# Patient Record
Sex: MALE | Race: WHITE | NOT HISPANIC OR LATINO | Employment: FULL TIME | URBAN - METROPOLITAN AREA
[De-identification: names, ages, dates, MRNs, and addresses within clinical notes are randomized per-mention and may not be internally consistent; named-entity substitution may affect disease eponyms.]

---

## 2023-05-25 LAB
ANION GAP IN SER/PLAS: 14 MMOL/L (ref 10–20)
CALCIUM (MG/DL) IN SER/PLAS: 9.7 MG/DL (ref 8.6–10.3)
CARBON DIOXIDE, TOTAL (MMOL/L) IN SER/PLAS: 29 MMOL/L (ref 21–32)
CHLORIDE (MMOL/L) IN SER/PLAS: 102 MMOL/L (ref 98–107)
CREATININE (MG/DL) IN SER/PLAS: 1.04 MG/DL (ref 0.5–1.3)
ERYTHROCYTE DISTRIBUTION WIDTH (RATIO) BY AUTOMATED COUNT: 12.8 % (ref 11.5–14.5)
ERYTHROCYTE MEAN CORPUSCULAR HEMOGLOBIN CONCENTRATION (G/DL) BY AUTOMATED: 33.1 G/DL (ref 32–36)
ERYTHROCYTE MEAN CORPUSCULAR VOLUME (FL) BY AUTOMATED COUNT: 90 FL (ref 80–100)
ERYTHROCYTES (10*6/UL) IN BLOOD BY AUTOMATED COUNT: 5.32 X10E12/L (ref 4.5–5.9)
GFR MALE: >90 ML/MIN/1.73M2
GLUCOSE (MG/DL) IN SER/PLAS: 86 MG/DL (ref 74–99)
HEMATOCRIT (%) IN BLOOD BY AUTOMATED COUNT: 47.8 % (ref 41–52)
HEMOGLOBIN (G/DL) IN BLOOD: 15.8 G/DL (ref 13.5–17.5)
LEUKOCYTES (10*3/UL) IN BLOOD BY AUTOMATED COUNT: 6.3 X10E9/L (ref 4.4–11.3)
PLATELETS (10*3/UL) IN BLOOD AUTOMATED COUNT: 172 X10E9/L (ref 150–450)
POTASSIUM (MMOL/L) IN SER/PLAS: 4.7 MMOL/L (ref 3.5–5.3)
SODIUM (MMOL/L) IN SER/PLAS: 140 MMOL/L (ref 136–145)
UREA NITROGEN (MG/DL) IN SER/PLAS: 12 MG/DL (ref 6–23)

## 2023-06-01 ENCOUNTER — HOSPITAL ENCOUNTER (OUTPATIENT)
Dept: DATA CONVERSION | Facility: HOSPITAL | Age: 28
End: 2023-06-01
Attending: INTERNAL MEDICINE | Admitting: INTERNAL MEDICINE
Payer: COMMERCIAL

## 2023-06-01 DIAGNOSIS — I48.0 PAROXYSMAL ATRIAL FIBRILLATION (MULTI): ICD-10-CM

## 2023-06-01 DIAGNOSIS — I47.10 SUPRAVENTRICULAR TACHYCARDIA (CMS-HCC): ICD-10-CM

## 2023-06-01 DIAGNOSIS — I48.91 UNSPECIFIED ATRIAL FIBRILLATION (MULTI): ICD-10-CM

## 2023-06-06 LAB
POC ACTIVATED CLOTTING TIME HIGH RANGE: 202 SECONDS (ref 96–152)
POC ACTIVATED CLOTTING TIME HIGH RANGE: 286 SECONDS (ref 96–152)
POC ACTIVATED CLOTTING TIME HIGH RANGE: 323 SECONDS (ref 96–152)

## 2023-08-18 LAB
ANION GAP IN SER/PLAS: 8 MMOL/L (ref 10–20)
CALCIUM (MG/DL) IN SER/PLAS: 9.6 MG/DL (ref 8.6–10.3)
CARBON DIOXIDE, TOTAL (MMOL/L) IN SER/PLAS: 30 MMOL/L (ref 21–32)
CHLORIDE (MMOL/L) IN SER/PLAS: 103 MMOL/L (ref 98–107)
CREATININE (MG/DL) IN SER/PLAS: 1.06 MG/DL (ref 0.5–1.3)
ERYTHROCYTE DISTRIBUTION WIDTH (RATIO) BY AUTOMATED COUNT: 12.5 % (ref 11.5–14.5)
ERYTHROCYTE MEAN CORPUSCULAR HEMOGLOBIN CONCENTRATION (G/DL) BY AUTOMATED: 34 G/DL (ref 32–36)
ERYTHROCYTE MEAN CORPUSCULAR VOLUME (FL) BY AUTOMATED COUNT: 88 FL (ref 80–100)
ERYTHROCYTES (10*6/UL) IN BLOOD BY AUTOMATED COUNT: 5.09 X10E12/L (ref 4.5–5.9)
GFR MALE: >90 ML/MIN/1.73M2
GLUCOSE (MG/DL) IN SER/PLAS: 89 MG/DL (ref 74–99)
HEMATOCRIT (%) IN BLOOD BY AUTOMATED COUNT: 45 % (ref 41–52)
HEMOGLOBIN (G/DL) IN BLOOD: 15.3 G/DL (ref 13.5–17.5)
LEUKOCYTES (10*3/UL) IN BLOOD BY AUTOMATED COUNT: 5.6 X10E9/L (ref 4.4–11.3)
PLATELETS (10*3/UL) IN BLOOD AUTOMATED COUNT: 172 X10E9/L (ref 150–450)
POTASSIUM (MMOL/L) IN SER/PLAS: 4.3 MMOL/L (ref 3.5–5.3)
SODIUM (MMOL/L) IN SER/PLAS: 137 MMOL/L (ref 136–145)
UREA NITROGEN (MG/DL) IN SER/PLAS: 12 MG/DL (ref 6–23)

## 2023-08-23 ENCOUNTER — HOSPITAL ENCOUNTER (OUTPATIENT)
Dept: DATA CONVERSION | Facility: HOSPITAL | Age: 28
End: 2023-08-23
Attending: INTERNAL MEDICINE | Admitting: INTERNAL MEDICINE
Payer: COMMERCIAL

## 2023-08-23 DIAGNOSIS — I45.6 PRE-EXCITATION SYNDROME: ICD-10-CM

## 2023-08-23 DIAGNOSIS — I47.10 SUPRAVENTRICULAR TACHYCARDIA (CMS-HCC): ICD-10-CM

## 2023-09-30 NOTE — H&P
History of Present Illness:   History Present Illness:  Reason for surgery: svt/ afiib ablation   HPI:    27 with no prior medical hx, presented for SVT/ Afib ablation. He was diagnosed with Afib on 2020, since then he had 3 episodes, only  AATD is flecainide, he is alos  on metop , not on AC.       The indications, risks, benefits, alternatives, and details of the procedure were explained to the patient who expressed understanding of the risks including but are not limited to: pain, bleeding, and infection for which the risk is less than 1%. More  serious risks, including cardiac perforation and tamponade, vascular trauma, pulmonary vein stenosis, atrio-esophageal fistula, diaphragmatic paralysis, life-threatening arrhythmia, need for permanent pacemaker, stroke, heart attack, and/or death, for  which the overall risk ranges 0.1-1%. After all questions were answered, the patient provided informed and written consent.       Allergies:        Allergies:  ·  amoxicillin : Angioedema (Moderate)    Home Medication Review:   Home Medications Reviewed: yes     Impression/Procedure:   ·  Impression and Planned Procedure: afib ablation       ERAS (Enhanced Recovery After Surgery):  ·  ERAS Patient: no       Physical Exam by System:    Respiratory/Thorax: Patent airways, CTAB, normal  breath sounds with good chest expansion, thorax symmetric   Cardiovascular: Regular, rate and rhythm, no murmurs,  2+ equal pulses of the extremities, normal S 1and S 2     Airway/Sedation Assessment:  ·  Emotional Status calm   ·  Neurologic alert & oriented x 3   ·  Respiratory clear to auscultation   ·  Cardiovascular rhythm & rate regular   ·  GI/ soft, nontender     · Pulses present: Pedal Left, Pedal Right, Radial Left, Radial Right     ·  Mouth Opening OK yes   ·  Neck Flexibility OK yes   ·  Loose Teeth no   ·  Oropharyngeal Classification Class II   ·  ASA PS Classification ASA II   ·  Sedation Plan moderate sedation     Consent:    COVID-19 Consent:  ·  COVID-19 Risk Consent Surgeon has reviewed key risks related to the risk of neli COVID-19 and if they contract COVID-19 what the risks are.     Coronavirus Attestation of Need for Surgery:  ·  COVID-19 Surgery / Procedure Attestation: Select all criteria that apply Risk of metastasis or progression of staging if delayed     Attestation:   Note Completion:  I am a:  Resident/Fellow   Attending Attestation I saw and evaluated the patient.  I personally obtained the key and critical portions of the history and physical exam or was physically present for key and  critical portions performed by the resident/fellow. I reviewed the resident/fellow?s documentation and discussed the patient with the resident/fellow.  I agree with the resident/fellow?s medical decision making as documented in the note.     I personally evaluated the patient on 01-Jun-2023         Electronic Signatures:  Eros Burnham)  (Signed 14-Jun-2023 09:16)   Authored: Note Completion   Co-Signer: History of Present Illness, Allergies, Home Medication Review, Impression/Procedure, ERAS, Physical Exam, Consent, Note Completion  Violeta Terry (Resident))  (Signed 01-Jun-2023 12:21)   Authored: History of Present Illness, Allergies, Home  Medication Review, Impression/Procedure, ERAS, Physical Exam, Consent, Note Completion      Last Updated: 14-Jun-2023 09:16 by Eros Burnham)

## 2023-09-30 NOTE — H&P
History of Present Illness:   History Present Illness:  Reason for surgery: WPW   HPI:    This is a 27 year old with a PMH significant for AF s/p RFA with PVI and WPW pattern on ECG with palpitations and SVT presenting for RFA.     Allergies:        Allergies:  ·  amoxicillin : Angioedema (Moderate)    Home Medication Review:   Home Medications Reviewed: yes     Impression/Procedure:   ·  Impression and Planned Procedure: WPW pattern with palpitations presenting for RFA       ERAS (Enhanced Recovery After Surgery):  ·  ERAS Patient: no       Physical Exam by System:    Constitutional: Well developed, awake/alert/oriented  x3, no distress, alert and cooperative   Respiratory/Thorax: Patent airways, normal breath  sounds with good chest expansion, thorax symmetric   Cardiovascular: Regular,  2+ equal pulses of the  extremities,   Gastrointestinal: Nondistended, soft, non-tender   Neurological: alert and oriented x3   Skin: Warm and dry, no lesions, no rashes     Consent:   COVID-19 Consent:  ·  COVID-19 Risk Consent Surgeon has reviewed key risks related to the risk of neli COVID-19 and if they contract COVID-19 what the risks are.     Attestation:   Note Completion:  I am a:  Resident/Fellow   Attending Attestation I saw and evaluated the patient.  I personally obtained the key and critical portions of the history and physical exam or was physically present for key and  critical portions performed by the resident/fellow. I reviewed the resident/fellow?s documentation and discussed the patient with the resident/fellow.  I agree with the resident/fellow?s medical decision making as documented in the note.     I personally evaluated the patient on 23-Aug-2023         Electronic Signatures:  Shane Juarez (Fellow))  (Signed 23-Aug-2023 17:01)   Authored: History of Present Illness, Allergies, Home  Medication Review, Impression/Procedure, ERAS, Physical Exam, Consent, Note Completion  Eros Burnham)   (Signed 24-Aug-2023 10:20)   Authored: Note Completion   Co-Signer: History of Present Illness, Allergies, Home Medication Review, Impression/Procedure, ERAS, Physical Exam, Consent, Note Completion      Last Updated: 24-Aug-2023 10:20 by Eros Burnham)

## 2023-10-07 PROBLEM — I45.6 WOLFF-PARKINSON-WHITE (WPW) PATTERN: Status: ACTIVE | Noted: 2023-10-07

## 2023-10-07 PROBLEM — I48.0 PAROXYSMAL ATRIAL FIBRILLATION (MULTI): Status: ACTIVE | Noted: 2023-10-07

## 2023-10-07 RX ORDER — METOPROLOL SUCCINATE 100 MG/1
100 TABLET, EXTENDED RELEASE ORAL DAILY
COMMUNITY
End: 2024-01-24 | Stop reason: SDUPTHER

## 2023-10-07 RX ORDER — FLECAINIDE ACETATE 50 MG/1
50 TABLET ORAL 2 TIMES DAILY
COMMUNITY
End: 2023-10-10 | Stop reason: ALTCHOICE

## 2023-10-07 RX ORDER — RIVAROXABAN 20 MG/1
20 TABLET, FILM COATED ORAL DAILY
COMMUNITY
Start: 2023-06-01 | End: 2023-10-09 | Stop reason: ALTCHOICE

## 2023-10-07 RX ORDER — MULTIVITAMIN
1 TABLET ORAL DAILY
COMMUNITY

## 2023-10-07 RX ORDER — PANTOPRAZOLE SODIUM 40 MG/1
40 TABLET, DELAYED RELEASE ORAL 2 TIMES DAILY
COMMUNITY
Start: 2023-06-01 | End: 2023-10-10 | Stop reason: ALTCHOICE

## 2023-10-09 ENCOUNTER — OFFICE VISIT (OUTPATIENT)
Dept: CARDIOLOGY | Facility: HOSPITAL | Age: 28
End: 2023-10-09
Payer: COMMERCIAL

## 2023-10-09 ENCOUNTER — HOSPITAL ENCOUNTER (OUTPATIENT)
Dept: CARDIOLOGY | Facility: HOSPITAL | Age: 28
Discharge: HOME | End: 2023-10-09
Payer: COMMERCIAL

## 2023-10-09 VITALS
WEIGHT: 214 LBS | BODY MASS INDEX: 30.71 KG/M2 | OXYGEN SATURATION: 99 % | SYSTOLIC BLOOD PRESSURE: 137 MMHG | DIASTOLIC BLOOD PRESSURE: 81 MMHG | HEART RATE: 80 BPM

## 2023-10-09 DIAGNOSIS — I45.6 WOLFF-PARKINSON-WHITE (WPW) PATTERN: Primary | ICD-10-CM

## 2023-10-09 DIAGNOSIS — I48.0 PAROXYSMAL ATRIAL FIBRILLATION (MULTI): ICD-10-CM

## 2023-10-09 PROCEDURE — 93005 ELECTROCARDIOGRAM TRACING: CPT

## 2023-10-09 PROCEDURE — 99214 OFFICE O/P EST MOD 30 MIN: CPT | Mod: 25 | Performed by: NURSE PRACTITIONER

## 2023-10-09 PROCEDURE — 93010 ELECTROCARDIOGRAM REPORT: CPT | Performed by: INTERNAL MEDICINE

## 2023-10-09 PROCEDURE — 1036F TOBACCO NON-USER: CPT | Performed by: NURSE PRACTITIONER

## 2023-10-09 PROCEDURE — 99214 OFFICE O/P EST MOD 30 MIN: CPT | Performed by: NURSE PRACTITIONER

## 2023-10-09 RX ORDER — MULTIVITAMIN
1 TABLET ORAL DAILY
Status: CANCELLED | OUTPATIENT
Start: 2023-10-09

## 2023-10-10 ASSESSMENT — ENCOUNTER SYMPTOMS
FEVER: 0
DOUBLE VISION: 0
ORTHOPNEA: 0
SNORING: 0
SORE THROAT: 0
DYSPNEA ON EXERTION: 0
HEMOPTYSIS: 0
NAUSEA: 0
BLURRED VISION: 0
PND: 0
PALPITATIONS: 0
SHORTNESS OF BREATH: 0
LIGHT-HEADEDNESS: 0
NEAR-SYNCOPE: 0
DIAPHORESIS: 0
SYNCOPE: 0
ABDOMINAL PAIN: 0
WEAKNESS: 0
DIZZINESS: 0
HEADACHES: 0
VOMITING: 0
MYALGIAS: 0
IRREGULAR HEARTBEAT: 0
FALLS: 0
COUGH: 0
DIARRHEA: 0
SPUTUM PRODUCTION: 0

## 2023-10-10 NOTE — PROGRESS NOTES
Subjective   Cristian Sanchez is a 28 y.o. male.    Initially referred by Dr. RAZA Colon for evaluation of AF. He presents today for 1 month follow up post WPW RFA    PMH includes AF (diagnosed 8/2020).   Treatment of his AF includes Flecainide and Metoprolol.   Symptoms of his AF includes palpitations which he describes as strong, rapid heat beats in the center of his chest.    Pt was initially diagnosed with AF in 8/2020. Then started on Flecainide in 11/2020 after being admitted for AF s/p COVID. He had a 3rd episode of AF in 6/2022 after missing doses of Flecainide.     Echo 8/2020: LV systolic function is normal; LVEF 56%. No LVH, no significant structural valvular abnormalities and normal left atrium.    Now s/p Afib RFA and negative EP study with Dr. Burnham 6/1/2023  Patient unfortunately had an ED visit 6/20 for palpitations and chest discomfort. ECG showed NSR with PACs  ECG 6/26/2023 NSR with WPW pattern    Now s/p midseptal accessory pathway RFA with Dr. Burnham 8/23/2023  ECG 10/9/2023 NSR HR 80 bpm, no preexcitation noted.    TODAY Patient is doing well 1 month post ablation.  He completed his Xarelto, Flecainide, and Pantoprazole therapy and is currently only taking metoprolol once a day.  He denies any chest pain or SOB.  He denies any episodes of rapid heart rates. He has felt a single palpitation here and there. He is wondering if his medications could be causing some side effects and would like to try to get off the metoprolol        Review of Systems   Constitutional: Negative for diaphoresis, fever and malaise/fatigue.   HENT:  Negative for congestion and sore throat.    Eyes:  Negative for blurred vision and double vision.   Cardiovascular:  Negative for chest pain, dyspnea on exertion, irregular heartbeat, leg swelling, near-syncope, orthopnea, palpitations, paroxysmal nocturnal dyspnea and syncope.   Respiratory:  Negative for cough, hemoptysis, shortness of breath, snoring and sputum  production.    Hematologic/Lymphatic: Negative for bleeding problem.   Skin:  Negative for rash.   Musculoskeletal:  Negative for falls, joint pain and myalgias.   Gastrointestinal:  Negative for abdominal pain, diarrhea, nausea and vomiting.   Neurological:  Negative for dizziness, headaches, light-headedness and weakness.   All other systems reviewed and are negative.      Objective   Constitutional:       Appearance: Healthy appearance. Not in distress.   Eyes:      Conjunctiva/sclera: Conjunctivae normal.   HENT:      Nose: Nose normal.    Mouth/Throat:      Pharynx: Oropharynx is clear.   Neck:      Vascular: No JVR. JVD normal.   Pulmonary:      Effort: Pulmonary effort is normal.      Breath sounds: Normal breath sounds. No wheezing. No rhonchi.   Chest:      Chest wall: Not tender to palpatation.   Cardiovascular:      Normal rate. Regular rhythm.      Murmurs: There is no murmur.      No rub.   Pulses:     Intact distal pulses.   Edema:     Peripheral edema absent.   Abdominal:      General: Bowel sounds are normal.      Palpations: Abdomen is soft.   Musculoskeletal: Normal range of motion.      Cervical back: Neck supple. Skin:     General: Skin is warm and dry.      Comments: Right groin site well approximated, no bruising/bleeding/swelling/redness/pain   Neurological:      Mental Status: Alert and oriented to person, place and time.      Motor: Motor function is intact.         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 A diagnosis of Paroxysmal atrial fibrillation  (CMS/[facility]) was also pertinent to this visit.  He is doing well since his WPW ablation.  He denies any further episodes palpitations or heart racing.  He is wondering if the medications could be causing patchy hair loss in his beard.  This is not a typical side effect from Xarelto, Flecainide or metoprolol, although metoprolol can cause itching.  He denies any pruritus.  He is now only on metoprolol, which I recommend he stay on until he's about 3 months post ablation. At that point, he can cut his dose in half and then potentially wean down further as directed by his general cardiologist    Continue metoprolol until 3 months post ablation, then cut the dose in half (50 mg daily). We discussed possible withdrawal symptoms should he abruptly stop his beta blocker and patient was encouraged not to do that.  Follow up with general cardiology as scheduled, follow up with EP as needed

## 2023-10-25 LAB
ATRIAL RATE: 80 BPM
P AXIS: 53 DEGREES
P OFFSET: 202 MS
P ONSET: 154 MS
PR INTERVAL: 130 MS
Q ONSET: 219 MS
QRS COUNT: 13 BEATS
QRS DURATION: 86 MS
QT INTERVAL: 374 MS
QTC CALCULATION(BAZETT): 431 MS
QTC FREDERICIA: 412 MS
R AXIS: 63 DEGREES
T AXIS: 18 DEGREES
T OFFSET: 406 MS
VENTRICULAR RATE: 80 BPM

## 2024-01-17 ENCOUNTER — APPOINTMENT (OUTPATIENT)
Dept: CARDIOLOGY | Facility: CLINIC | Age: 29
End: 2024-01-17
Payer: COMMERCIAL

## 2024-01-20 ENCOUNTER — LAB REQUISITION (OUTPATIENT)
Dept: LAB | Facility: HOSPITAL | Age: 29
End: 2024-01-20
Payer: COMMERCIAL

## 2024-01-20 DIAGNOSIS — R35.0 FREQUENCY OF MICTURITION: ICD-10-CM

## 2024-01-20 PROCEDURE — 87491 CHLMYD TRACH DNA AMP PROBE: CPT

## 2024-01-20 PROCEDURE — 87661 TRICHOMONAS VAGINALIS AMPLIF: CPT

## 2024-01-20 PROCEDURE — 87086 URINE CULTURE/COLONY COUNT: CPT

## 2024-01-20 PROCEDURE — 87800 DETECT AGNT MULT DNA DIREC: CPT

## 2024-01-20 PROCEDURE — 87591 N.GONORRHOEAE DNA AMP PROB: CPT

## 2024-01-21 LAB
C TRACH RRNA SPEC QL NAA+PROBE: NEGATIVE
N GONORRHOEA DNA SPEC QL PROBE+SIG AMP: NEGATIVE
T VAGINALIS RRNA SPEC QL NAA+PROBE: NEGATIVE

## 2024-01-22 LAB — BACTERIA UR CULT: NO GROWTH

## 2024-01-23 ENCOUNTER — TELEPHONE (OUTPATIENT)
Dept: CARDIOLOGY | Facility: HOSPITAL | Age: 29
End: 2024-01-23
Payer: COMMERCIAL

## 2024-01-23 DIAGNOSIS — I48.0 PAROXYSMAL ATRIAL FIBRILLATION (MULTI): Primary | ICD-10-CM

## 2024-01-24 RX ORDER — METOPROLOL SUCCINATE 100 MG/1
100 TABLET, EXTENDED RELEASE ORAL DAILY
Qty: 90 TABLET | Refills: 3 | Status: SHIPPED | OUTPATIENT
Start: 2024-01-24 | End: 2024-02-14 | Stop reason: SDUPTHER

## 2024-02-13 PROBLEM — F41.9 ANXIETY DISORDER: Status: ACTIVE | Noted: 2024-02-13

## 2024-02-13 PROBLEM — J30.2 SEASONAL ALLERGIES: Status: ACTIVE | Noted: 2024-02-13

## 2024-02-14 ENCOUNTER — OFFICE VISIT (OUTPATIENT)
Dept: CARDIOLOGY | Facility: CLINIC | Age: 29
End: 2024-02-14
Payer: COMMERCIAL

## 2024-02-14 VITALS
HEART RATE: 66 BPM | SYSTOLIC BLOOD PRESSURE: 159 MMHG | HEIGHT: 71 IN | DIASTOLIC BLOOD PRESSURE: 94 MMHG | BODY MASS INDEX: 28.14 KG/M2 | WEIGHT: 201 LBS | OXYGEN SATURATION: 100 %

## 2024-02-14 DIAGNOSIS — I48.0 PAROXYSMAL ATRIAL FIBRILLATION (MULTI): Primary | ICD-10-CM

## 2024-02-14 PROCEDURE — 93010 ELECTROCARDIOGRAM REPORT: CPT | Performed by: INTERNAL MEDICINE

## 2024-02-14 PROCEDURE — 99213 OFFICE O/P EST LOW 20 MIN: CPT | Performed by: INTERNAL MEDICINE

## 2024-02-14 PROCEDURE — 1036F TOBACCO NON-USER: CPT | Performed by: INTERNAL MEDICINE

## 2024-02-14 PROCEDURE — 93005 ELECTROCARDIOGRAM TRACING: CPT | Performed by: INTERNAL MEDICINE

## 2024-02-14 RX ORDER — METOPROLOL SUCCINATE 50 MG/1
50 TABLET, EXTENDED RELEASE ORAL DAILY
Qty: 90 TABLET | Refills: 3 | Status: SHIPPED | OUTPATIENT
Start: 2024-02-14

## 2024-02-14 ASSESSMENT — PAIN SCALES - GENERAL: PAINLEVEL: 0-NO PAIN

## 2024-02-14 NOTE — PROGRESS NOTES
Subjective   Cristian Sanchez is a 28 y.o. male who presents to the [facility]  for follow up for atrial fibrillation management. Last seen in July 2023.     Since our last visit, he had follow up ablation for WPW accessory pathway in August 2023 after initial June 2023 ablation for atrial fibrillation.    Now, Cristian has no active cardiac symptoms of chest pain, dyspnea on exertion, PND, orthopnea, KELSEY, palpitations, syncope, or claudication. Off DOAC and flecainide.     He was diagnosed with paroxysmal atrial fibrillation in 2020. Has had 3 hospital observation stays for AFib with RVR episodes. Last one in June 2022 after he missed doses of flecainide that required a Cardizem drip for rate control. 2020 echocardiogram was structurally normal. Started on flecainide in November 2020 after 2nd AFib admission about 1 month after COVID-19 viral infection per 11/2020 discharge summary. Initial diagnosis in August 2020. Referred to EP after our initial visit in January 2023, underwent RFA on 6/1/2023 by Dr. Burnham. Follow up ECG after RFA showed delta wave indicating WPW accessory pathway that prompted second ablation.     Past Medical History:  1. Paroxysmal atrial fibrillation s/p 6/1/2023 AFib RFA and 8/23/2023 WPW ablation  2. WPW s/p 8/23/2023 ablation     Social History:  No tobacco use.     Family History:  No premature CAD in 1st degree relatives ( 55 years of age for male relatives, 65 years of age for female relatives). Grandmother had atrial fibrillation at an advanced age.     Review of Systems    A 14 point review of systems was asked. All questions were negative except for pertinent positives listed in the HPI.     Current Outpatient Medications on File Prior to Visit   Medication Sig Dispense Refill    metoprolol succinate XL (Toprol-XL) 100 mg 24 hr tablet Take 1 tablet (100 mg) by mouth once daily. 90 tablet 3    multivitamin tablet Take 1 tablet by mouth once daily.       No  "current facility-administered medications on file prior to visit.      Objective   Physical Exam  BP Readings from Last 3 Encounters:   24 (!) 159/94   10/09/23 137/81   23 117/77      Wt Readings from Last 3 Encounters:   24 91.2 kg (201 lb)   10/09/23 97.1 kg (214 lb)   23 95.9 kg (211 lb 5 oz)      BMI: Estimated body mass index is 28.03 kg/m² as calculated from the following:    Height as of this encounter: 1.803 m (5' 11\").    Weight as of this encounter: 91.2 kg (201 lb).  BSA: Estimated body surface area is 2.14 meters squared as calculated from the following:    Height as of this encounter: 1.803 m (5' 11\").    Weight as of this encounter: 91.2 kg (201 lb).    General: no acute distress  HEENT: EOMI, no scleral icterus.  Lungs: Clear to auscultation bilaterally without wheezing, rales, or rhonchi.  Cardiovascular: Regular rhythm and rate. Normal S1 and S2. No murmurs, rubs, or gallops are appreciated. JVP normal.  Abdomen: Soft, nontender, nondistended. Bowel sounds present.  Extremities: Warm and well perfused with equal 2+ pulses bilaterally.  No edema present.  Neurologic: Alert and oriented x3.    I have personally reviewed the following images and laboratory findings:  Last echocardiogram:  Most recent echo, 2020: LV EF 55-60%, no LVH (LVMI 87 gm/m2), normal diastology (E/e' 3), normal LA size (ESSIE 20 ml/m2), normal RV/RA, no AI, no MR, trace TR, RVSP 21 mm Hg.     Last cath / stress test:  None    Most recent EC2024 ECG: Sinus rhythm, 63 bpm, normal ECG without WPW pre-excitation. Personally reviewed in office.     Assessment/Plan   1. History of WPW/ Atrial fibrillation arrhythmia:  Early onset in in early 20s without underlying structural heart disease. Had 3 total paroxysmal episodes prior to referral to EP and underwent 2023 AFib PVI RFA with Dr. Burnham. ECG at 2023 follow up visit showed WPW pre-excitation delta wave and subsequently underwent " 8/23/2023 RFA.     Completed 3 months of post ablation anticoagulation. Off flecainide.     We can wean metoprolol with reduction to 50 mg a day now and then reduce to 25 mg a day in 3 months if no palpitations. .      Follow up with Dr. Colon in 6 months.            SIGNATURE: Michael Colon MD PATIENT NAME: Cristian Sanchez   DATE/TIME: February 14, 2024 4:48 PM MRN: [id number]

## 2024-02-14 NOTE — PATIENT INSTRUCTIONS
You were seen in the [facility] for your history of paroxysmal atrial fibrillation.     Your ECG today is in normal rhythm at 63 beats per minute. There is no evidence of your prior WPW pattern after ablations for WPW and atrial fibrillation.     Your heart exam is normal. Your 2020 echocardiogram (ultrasound of the heart) showed that your heart is structurally normal.     We can wean metoprolol with reduction to 50 mg a day now and then reduce to 25 mg a day in 3 months if no palpitations. I sent a new prescription to [facility] for 50 mg tablets. You can cut your 100 mg tablets in half for now.     Follow up with Dr. Colon in 6 months.

## 2024-02-22 LAB
ATRIAL RATE: 63 BPM
P AXIS: 68 DEGREES
P OFFSET: 200 MS
P ONSET: 148 MS
PR INTERVAL: 142 MS
Q ONSET: 219 MS
QRS COUNT: 10 BEATS
QRS DURATION: 88 MS
QT INTERVAL: 386 MS
QTC CALCULATION(BAZETT): 395 MS
QTC FREDERICIA: 392 MS
R AXIS: 67 DEGREES
T AXIS: 31 DEGREES
T OFFSET: 412 MS
VENTRICULAR RATE: 63 BPM

## 2024-08-14 ENCOUNTER — OFFICE VISIT (OUTPATIENT)
Dept: CARDIOLOGY | Facility: CLINIC | Age: 29
End: 2024-08-14
Payer: COMMERCIAL

## 2024-08-14 VITALS
OXYGEN SATURATION: 98 % | HEIGHT: 71 IN | BODY MASS INDEX: 29.62 KG/M2 | HEART RATE: 59 BPM | WEIGHT: 211.6 LBS | SYSTOLIC BLOOD PRESSURE: 120 MMHG | DIASTOLIC BLOOD PRESSURE: 77 MMHG

## 2024-08-14 DIAGNOSIS — I48.0 PAROXYSMAL ATRIAL FIBRILLATION (MULTI): ICD-10-CM

## 2024-08-14 PROCEDURE — 99213 OFFICE O/P EST LOW 20 MIN: CPT | Performed by: INTERNAL MEDICINE

## 2024-08-14 PROCEDURE — 3008F BODY MASS INDEX DOCD: CPT | Performed by: INTERNAL MEDICINE

## 2024-08-14 PROCEDURE — 1036F TOBACCO NON-USER: CPT | Performed by: INTERNAL MEDICINE

## 2024-08-14 RX ORDER — METOPROLOL SUCCINATE 25 MG/1
25 TABLET, EXTENDED RELEASE ORAL DAILY
Qty: 30 TABLET | Refills: 11 | Status: SHIPPED | OUTPATIENT
Start: 2024-08-14

## 2024-08-14 RX ORDER — METOPROLOL SUCCINATE 25 MG/1
25 TABLET, EXTENDED RELEASE ORAL DAILY
Qty: 30 TABLET | Refills: 11 | Status: SHIPPED | OUTPATIENT
Start: 2024-08-14 | End: 2024-08-14 | Stop reason: SDUPTHER

## 2024-08-14 ASSESSMENT — PATIENT HEALTH QUESTIONNAIRE - PHQ9
1. LITTLE INTEREST OR PLEASURE IN DOING THINGS: NOT AT ALL
SUM OF ALL RESPONSES TO PHQ9 QUESTIONS 1 AND 2: 0
2. FEELING DOWN, DEPRESSED OR HOPELESS: NOT AT ALL

## 2024-08-14 ASSESSMENT — ENCOUNTER SYMPTOMS
OCCASIONAL FEELINGS OF UNSTEADINESS: 0
DEPRESSION: 0
LOSS OF SENSATION IN FEET: 0

## 2024-08-14 ASSESSMENT — COLUMBIA-SUICIDE SEVERITY RATING SCALE - C-SSRS
6. HAVE YOU EVER DONE ANYTHING, STARTED TO DO ANYTHING, OR PREPARED TO DO ANYTHING TO END YOUR LIFE?: NO
1. IN THE PAST MONTH, HAVE YOU WISHED YOU WERE DEAD OR WISHED YOU COULD GO TO SLEEP AND NOT WAKE UP?: NO
2. HAVE YOU ACTUALLY HAD ANY THOUGHTS OF KILLING YOURSELF?: NO

## 2024-08-14 NOTE — PROGRESS NOTES
Subjective   Cristian Sanchez is a 28 y.o. male who presents to the [facility]  for follow up for atrial fibrillation management. Last seen in February 2024.    No palpitations or symptoms taking metoprolol ER 50 mg a day since prior visit.      He had follow up ablation for WPW accessory pathway in August 2023 after initial June 2023 ablation for atrial fibrillation.    Now, Cristian has no active cardiac symptoms of chest pain, dyspnea on exertion, PND, orthopnea, KELSEY, palpitations, syncope, or claudication. Off DOAC and flecainide.     He was diagnosed with paroxysmal atrial fibrillation in 2020. Has had 3 hospital observation stays for AFib with RVR episodes. Last one in June 2022 after he missed doses of flecainide that required a Cardizem drip for rate control. 2020 echocardiogram was structurally normal. Started on flecainide in November 2020 after 2nd AFib admission about 1 month after COVID-19 viral infection per 11/2020 discharge summary. Initial diagnosis in August 2020. Referred to EP after our initial visit in January 2023, underwent RFA on 6/1/2023 by Dr. Burnham. Follow up ECG after RFA showed delta wave indicating WPW accessory pathway that prompted second ablation.     Past Medical History:  1. Paroxysmal atrial fibrillation s/p 6/1/2023 AFib RFA and 8/23/2023 WPW ablation  2. WPW s/p 8/23/2023 ablation     Social History:  No tobacco use.     Family History:  No premature CAD in 1st degree relatives ( 55 years of age for male relatives, 65 years of age for female relatives). Grandmother had atrial fibrillation at an advanced age.     Review of Systems    A 14 point review of systems was asked. All questions were negative except for pertinent positives listed in the HPI.     Current Outpatient Medications on File Prior to Visit   Medication Sig Dispense Refill    metoprolol succinate XL (Toprol-XL) 50 mg 24 hr tablet Take 1 tablet (50 mg) by mouth once daily. 90 tablet 3     "multivitamin tablet Take 1 tablet by mouth once daily.       No current facility-administered medications on file prior to visit.      Objective   Physical Exam  BP Readings from Last 3 Encounters:   24 120/77   24 (!) 159/94   10/09/23 137/81      Wt Readings from Last 3 Encounters:   24 96 kg (211 lb 9.6 oz)   24 91.2 kg (201 lb)   10/09/23 97.1 kg (214 lb)      BMI: Estimated body mass index is 29.51 kg/m² as calculated from the following:    Height as of this encounter: 1.803 m (5' 11\").    Weight as of this encounter: 96 kg (211 lb 9.6 oz).  BSA: Estimated body surface area is 2.19 meters squared as calculated from the following:    Height as of this encounter: 1.803 m (5' 11\").    Weight as of this encounter: 96 kg (211 lb 9.6 oz).    General: no acute distress  HEENT: EOMI, no scleral icterus.  Lungs: Clear to auscultation bilaterally without wheezing, rales, or rhonchi.  Cardiovascular: Regular rhythm and rate. Normal S1 and S2. No murmurs, rubs, or gallops are appreciated. JVP normal.  Abdomen: Soft, nontender, nondistended. Bowel sounds present.  Extremities: Warm and well perfused with equal 2+ pulses bilaterally.  No edema present.  Neurologic: Alert and oriented x3.    I have personally reviewed the following images and laboratory findings:  Last echocardiogram:  Most recent echo, 2020: LV EF 55-60%, no LVH (LVMI 87 gm/m2), normal diastology (E/e' 3), normal LA size (ESSIE 20 ml/m2), normal RV/RA, no AI, no MR, trace TR, RVSP 21 mm Hg.     Last cath / stress test:  None    Most recent EC2024 ECG: Sinus rhythm, 63 bpm, normal ECG without WPW pre-excitation. Personally reviewed in office.     Assessment/Plan   1. History of WPW/ Atrial fibrillation arrhythmia:  Early onset in in early 20s without underlying structural heart disease. Had 3 total paroxysmal episodes prior to referral to EP and underwent 2023 AFib PVI RFA with Dr. Burnham. ECG at 2023 follow up " visit showed WPW pre-excitation delta wave and subsequently underwent 8/23/2023 RFA.     Completed 3 months of post ablation anticoagulation. Off flecainide.     We can wean metoprolol with reduction to 25 mg a day now from 50 mg a day. If no symptoms for 2 months on 25 mg a day dose, can hold medication and observe for new palpitations.      Follow up with Dr. Colon in 6 months.            SIGNATURE: Michael Colon MD PATIENT NAME: Cristian Sanchez   DATE/TIME: August 14, 2024 4:47 PM MRN: [id number]

## 2024-08-14 NOTE — PATIENT INSTRUCTIONS
You were seen in the [facility] for your history of paroxysmal atrial fibrillation.     Your last ECG showed normal rhythm at 63 beats per minute. There was no evidence of your prior WPW pattern after ablations for WPW and atrial fibrillation.     Your heart exam is normal. Your 2020 echocardiogram (ultrasound of the heart) showed that your heart is structurally normal.     We can wean metoprolol with reduction to 25 mg a day now from 50 mg. You can STOP 25 mg a day if no symptoms after taking lower dose for 2 months. I sent a new prescription to [facility] for 25 mg tablets.      Follow up with Dr. Colon in 6 months.

## 2024-10-31 ENCOUNTER — APPOINTMENT (OUTPATIENT)
Dept: PRIMARY CARE | Facility: CLINIC | Age: 29
End: 2024-10-31
Payer: COMMERCIAL

## 2024-10-31 VITALS
HEIGHT: 71 IN | WEIGHT: 192 LBS | BODY MASS INDEX: 26.88 KG/M2 | RESPIRATION RATE: 16 BRPM | DIASTOLIC BLOOD PRESSURE: 72 MMHG | HEART RATE: 75 BPM | TEMPERATURE: 98.2 F | OXYGEN SATURATION: 99 % | SYSTOLIC BLOOD PRESSURE: 124 MMHG

## 2024-10-31 DIAGNOSIS — I48.0 PAROXYSMAL ATRIAL FIBRILLATION (MULTI): ICD-10-CM

## 2024-10-31 DIAGNOSIS — Z00.00 HEALTHCARE MAINTENANCE: Primary | ICD-10-CM

## 2024-10-31 DIAGNOSIS — I45.6 WOLFF-PARKINSON-WHITE (WPW) PATTERN: ICD-10-CM

## 2024-10-31 PROBLEM — F41.9 ANXIETY DISORDER: Status: RESOLVED | Noted: 2024-02-13 | Resolved: 2024-10-31

## 2024-10-31 PROCEDURE — 99385 PREV VISIT NEW AGE 18-39: CPT

## 2024-10-31 PROCEDURE — 90656 IIV3 VACC NO PRSV 0.5 ML IM: CPT

## 2024-10-31 PROCEDURE — 90471 IMMUNIZATION ADMIN: CPT

## 2024-10-31 PROCEDURE — 1036F TOBACCO NON-USER: CPT

## 2024-10-31 PROCEDURE — 3008F BODY MASS INDEX DOCD: CPT

## 2024-10-31 SDOH — ECONOMIC STABILITY: INCOME INSECURITY: IN THE LAST 12 MONTHS, WAS THERE A TIME WHEN YOU WERE NOT ABLE TO PAY THE MORTGAGE OR RENT ON TIME?: NO

## 2024-10-31 SDOH — HEALTH STABILITY: PHYSICAL HEALTH: ON AVERAGE, HOW MANY MINUTES DO YOU ENGAGE IN EXERCISE AT THIS LEVEL?: 60 MIN

## 2024-10-31 SDOH — ECONOMIC STABILITY: TRANSPORTATION INSECURITY
IN THE PAST 12 MONTHS, HAS LACK OF TRANSPORTATION KEPT YOU FROM MEETINGS, WORK, OR FROM GETTING THINGS NEEDED FOR DAILY LIVING?: NO

## 2024-10-31 SDOH — ECONOMIC STABILITY: GENERAL
WHICH OF THE FOLLOWING DO YOU KNOW HOW TO USE AND HAVE ACCESS TO EVERY DAY? (CHOOSE ALL THAT APPLY): DESKTOP COMPUTER, LAPTOP COMPUTER, OR TABLET WITH BROADBAND INTERNET CONNECTION;SMARTPHONE WITH CELLULAR DATA PLAN

## 2024-10-31 SDOH — ECONOMIC STABILITY: FOOD INSECURITY: WITHIN THE PAST 12 MONTHS, THE FOOD YOU BOUGHT JUST DIDN'T LAST AND YOU DIDN'T HAVE MONEY TO GET MORE.: NEVER TRUE

## 2024-10-31 SDOH — ECONOMIC STABILITY: GENERAL
WHICH OF THE FOLLOWING WOULD YOU LIKE TO GET CONNECTED TO IN ORDER TO RECEIVE A DISCOUNT OR FOR FREE? (CHOOSE ALL THAT APPLY): NONE OF THESE

## 2024-10-31 SDOH — ECONOMIC STABILITY: FOOD INSECURITY: WITHIN THE PAST 12 MONTHS, YOU WORRIED THAT YOUR FOOD WOULD RUN OUT BEFORE YOU GOT MONEY TO BUY MORE.: NEVER TRUE

## 2024-10-31 SDOH — HEALTH STABILITY: PHYSICAL HEALTH: ON AVERAGE, HOW MANY DAYS PER WEEK DO YOU ENGAGE IN MODERATE TO STRENUOUS EXERCISE (LIKE A BRISK WALK)?: 5 DAYS

## 2024-10-31 SDOH — ECONOMIC STABILITY: TRANSPORTATION INSECURITY
IN THE PAST 12 MONTHS, HAS THE LACK OF TRANSPORTATION KEPT YOU FROM MEDICAL APPOINTMENTS OR FROM GETTING MEDICATIONS?: NO

## 2024-10-31 ASSESSMENT — LIFESTYLE VARIABLES
HOW MANY STANDARD DRINKS CONTAINING ALCOHOL DO YOU HAVE ON A TYPICAL DAY: 1 OR 2
AUDIT-C TOTAL SCORE: 2
HOW OFTEN DO YOU HAVE A DRINK CONTAINING ALCOHOL: 2-4 TIMES A MONTH
SKIP TO QUESTIONS 9-10: 1
HOW OFTEN DO YOU HAVE SIX OR MORE DRINKS ON ONE OCCASION: NEVER

## 2024-10-31 ASSESSMENT — SOCIAL DETERMINANTS OF HEALTH (SDOH)
HOW OFTEN DO YOU ATTENT MEETINGS OF THE CLUB OR ORGANIZATION YOU BELONG TO?: NEVER
WITHIN THE LAST YEAR, HAVE YOU BEEN KICKED, HIT, SLAPPED, OR OTHERWISE PHYSICALLY HURT BY YOUR PARTNER OR EX-PARTNER?: NO
WITHIN THE LAST YEAR, HAVE YOU BEEN AFRAID OF YOUR PARTNER OR EX-PARTNER?: NO
WITHIN THE LAST YEAR, HAVE YOU BEEN HUMILIATED OR EMOTIONALLY ABUSED IN OTHER WAYS BY YOUR PARTNER OR EX-PARTNER?: NO
DO YOU BELONG TO ANY CLUBS OR ORGANIZATIONS SUCH AS CHURCH GROUPS UNIONS, FRATERNAL OR ATHLETIC GROUPS, OR SCHOOL GROUPS?: NO
WITHIN THE LAST YEAR, HAVE TO BEEN RAPED OR FORCED TO HAVE ANY KIND OF SEXUAL ACTIVITY BY YOUR PARTNER OR EX-PARTNER?: NO
IN THE PAST 12 MONTHS, HAS THE ELECTRIC, GAS, OIL, OR WATER COMPANY THREATENED TO SHUT OFF SERVICE IN YOUR HOME?: NO
HOW OFTEN DO YOU GET TOGETHER WITH FRIENDS OR RELATIVES?: ONCE A WEEK
HOW HARD IS IT FOR YOU TO PAY FOR THE VERY BASICS LIKE FOOD, HOUSING, MEDICAL CARE, AND HEATING?: NOT HARD AT ALL
HOW OFTEN DO YOU ATTEND CHURCH OR RELIGIOUS SERVICES?: NEVER
IN A TYPICAL WEEK, HOW MANY TIMES DO YOU TALK ON THE PHONE WITH FAMILY, FRIENDS, OR NEIGHBORS?: MORE THAN THREE TIMES A WEEK
ARE YOU MARRIED, WIDOWED, DIVORCED, SEPARATED, NEVER MARRIED, OR LIVING WITH A PARTNER?: NEVER MARRIED

## 2024-10-31 ASSESSMENT — PATIENT HEALTH QUESTIONNAIRE - PHQ9
1. LITTLE INTEREST OR PLEASURE IN DOING THINGS: NOT AT ALL
2. FEELING DOWN, DEPRESSED OR HOPELESS: NOT AT ALL
SUM OF ALL RESPONSES TO PHQ9 QUESTIONS 1 & 2: 0

## 2024-10-31 ASSESSMENT — ENCOUNTER SYMPTOMS
OCCASIONAL FEELINGS OF UNSTEADINESS: 0
LOSS OF SENSATION IN FEET: 0
DEPRESSION: 0

## 2025-05-27 ENCOUNTER — OFFICE VISIT (OUTPATIENT)
Dept: PRIMARY CARE | Facility: CLINIC | Age: 30
End: 2025-05-27
Payer: COMMERCIAL

## 2025-05-27 VITALS
RESPIRATION RATE: 16 BRPM | DIASTOLIC BLOOD PRESSURE: 80 MMHG | WEIGHT: 194 LBS | BODY MASS INDEX: 27.16 KG/M2 | TEMPERATURE: 97.5 F | SYSTOLIC BLOOD PRESSURE: 132 MMHG | OXYGEN SATURATION: 98 % | HEIGHT: 71 IN | HEART RATE: 80 BPM

## 2025-05-27 DIAGNOSIS — R30.0 DYSURIA: Primary | ICD-10-CM

## 2025-05-27 DIAGNOSIS — I48.11 LONGSTANDING PERSISTENT ATRIAL FIBRILLATION (MULTI): ICD-10-CM

## 2025-05-27 DIAGNOSIS — Z11.3 ENCOUNTER FOR SCREENING EXAMINATION FOR SEXUALLY TRANSMITTED INFECTION: ICD-10-CM

## 2025-05-27 LAB
POC APPEARANCE, URINE: CLEAR
POC BILIRUBIN, URINE: ABNORMAL
POC BLOOD, URINE: NEGATIVE
POC COLOR, URINE: YELLOW
POC GLUCOSE, URINE: NEGATIVE MG/DL
POC KETONES, URINE: ABNORMAL MG/DL
POC LEUKOCYTES, URINE: NEGATIVE
POC NITRITE,URINE: NEGATIVE
POC PH, URINE: 6 PH
POC PROTEIN, URINE: NEGATIVE MG/DL
POC SPECIFIC GRAVITY, URINE: 1.02
POC UROBILINOGEN, URINE: 0.2 EU/DL

## 2025-05-27 PROCEDURE — 1036F TOBACCO NON-USER: CPT

## 2025-05-27 PROCEDURE — 81003 URINALYSIS AUTO W/O SCOPE: CPT

## 2025-05-27 PROCEDURE — 99214 OFFICE O/P EST MOD 30 MIN: CPT

## 2025-05-27 PROCEDURE — 3008F BODY MASS INDEX DOCD: CPT

## 2025-05-27 RX ORDER — DOXYCYCLINE 100 MG/1
100 CAPSULE ORAL 2 TIMES DAILY
Qty: 14 CAPSULE | Refills: 0 | Status: SHIPPED | OUTPATIENT
Start: 2025-05-27 | End: 2025-06-03

## 2025-05-27 ASSESSMENT — PATIENT HEALTH QUESTIONNAIRE - PHQ9
SUM OF ALL RESPONSES TO PHQ9 QUESTIONS 1 AND 2: 0
2. FEELING DOWN, DEPRESSED OR HOPELESS: NOT AT ALL
1. LITTLE INTEREST OR PLEASURE IN DOING THINGS: NOT AT ALL

## 2025-05-27 NOTE — PROGRESS NOTES
"Subjective   Patient ID: Cristian Sanchez is a 29 y.o. male who presents for UTI (Discomfort with urination, frequency and having trouble performing sexually. Having trouble with erections and feeling different.).    UTI        History of Present Illness  The patient is a 29-year-old male who presents today for concerns regarding dysuria.    He reports experiencing mild discomfort during urination, increased frequency of urination, and a sensation of incomplete bladder emptying. He also notes occasional cloudiness in his urine but has not observed any purulent discharge from the urethra. Additionally, he mentions increased testicular sensitivity but does not report any systemic symptoms such as fever, hematuria/hematospermia or malaise. He has no history of sexually transmitted infections. He recalls an incident of erectile dysfunction with a new sexual partner the previous night, which was a first-time encounter. His sexual history includes regular intercourse with another partner until 09/2024, with whom he consistently used protection.       Review of Systems    Objective   /80 (BP Location: Right arm, Patient Position: Sitting)   Pulse 80   Temp 36.4 °C (97.5 °F)   Resp 16   Ht 1.803 m (5' 11\")   Wt 88 kg (194 lb)   SpO2 98%   BMI 27.06 kg/m²     Physical Exam  Exam conducted with a chaperone present.   Genitourinary:     Comments: Mild tenderness with palpation of bilateral testicles, no obvious epididymal.  No discernible lesions along genital surfaces, no pus at urethral meatus.  Does have prominent bilateral inguinal lymphadenopathy        Assessment/Plan   Problem List Items Addressed This Visit    None  Visit Diagnoses         Codes      Dysuria    -  Primary R30.0    Relevant Medications    doxycycline (Vibramycin) 100 mg capsule    Other Relevant Orders    POCT UA Automated manually resulted (Completed)    Urine Culture    C. trachomatis / N. gonorrhoeae, Amplified, Urogenital    CBC    " Basic metabolic panel    HIV 1/2 Antigen/Antibody Screen with Reflex to Confirmation    Trichomonas vaginalis, Amplified      Encounter for screening examination for sexually transmitted infection     Z11.3    Relevant Orders    Syphilis Screen with Reflex          Assessment & Plan  1. Dysuria.  - Reports dysuria with symptoms starting a few weeks to a month ago, including discomfort during urination, frequent urination, and a sensation of incomplete bladder emptying. No significant burning, back pain, or fever. Occasional cloudy urine and increased testicular sensitivity noted.  - Physical examination revealed no pus but some tenderness and possibly prominent lymph nodes. Urinalysis showed a slight elevation in bilirubin and the presence of ketones, but noOther concerning abnormalities notable.  - A urine culture will be ordered.  Blood work will be conducted to exclude any systemic issues.  Screening for STI   - An antibiotic regimen will be initiated to address potential chlamydia or other common infections. If all tests return normal, further evaluation for performance-related issues will be considered.  Consider antibiotic discontinuation in that setting.    Francisco Fink, DO              This medical note was created with the assistance of artificial intelligence (AI) for documentation purposes. The content has been reviewed and confirmed by the healthcare provider for accuracy and completeness. Patient consented to the use of audio recording and use of AI during their visit.

## 2025-05-28 LAB — T VAGINALIS RRNA SPEC QL NAA+PROBE: NOT DETECTED

## 2025-05-29 LAB
ANION GAP SERPL CALCULATED.4IONS-SCNC: 16 MMOL/L (CALC) (ref 7–17)
BACTERIA UR CULT: NORMAL
BUN SERPL-MCNC: 11 MG/DL (ref 7–25)
BUN/CREAT SERPL: NORMAL (CALC) (ref 6–22)
C TRACH RRNA SPEC QL NAA+PROBE: NOT DETECTED
CALCIUM SERPL-MCNC: 9.6 MG/DL (ref 8.6–10.3)
CHLORIDE SERPL-SCNC: 100 MMOL/L (ref 98–110)
CO2 SERPL-SCNC: 22 MMOL/L (ref 20–32)
CREAT SERPL-MCNC: 1.02 MG/DL (ref 0.6–1.24)
EGFRCR SERPLBLD CKD-EPI 2021: 103 ML/MIN/1.73M2
ERYTHROCYTE [DISTWIDTH] IN BLOOD BY AUTOMATED COUNT: 13.4 % (ref 11–15)
GLUCOSE SERPL-MCNC: 85 MG/DL (ref 65–99)
HCT VFR BLD AUTO: 48.2 % (ref 38.5–50)
HGB BLD-MCNC: 15.8 G/DL (ref 13.2–17.1)
HIV 1+2 AB+HIV1 P24 AG SERPL QL IA: NORMAL
MCH RBC QN AUTO: 30.4 PG (ref 27–33)
MCHC RBC AUTO-ENTMCNC: 32.8 G/DL (ref 32–36)
MCV RBC AUTO: 92.9 FL (ref 80–100)
N GONORRHOEA RRNA SPEC QL NAA+PROBE: NOT DETECTED
PLATELET # BLD AUTO: 214 THOUSAND/UL (ref 140–400)
PMV BLD REES-ECKER: 11.8 FL (ref 7.5–12.5)
POTASSIUM SERPL-SCNC: 4.1 MMOL/L (ref 3.5–5.3)
QUEST GC CT AMPLIFIED (ALWAYS MESSAGE): NORMAL
RBC # BLD AUTO: 5.19 MILLION/UL (ref 4.2–5.8)
SODIUM SERPL-SCNC: 138 MMOL/L (ref 135–146)
T PALLIDUM AB SER QL IA: NEGATIVE
WBC # BLD AUTO: 7.8 THOUSAND/UL (ref 3.8–10.8)